# Patient Record
Sex: FEMALE | Race: WHITE | NOT HISPANIC OR LATINO
[De-identification: names, ages, dates, MRNs, and addresses within clinical notes are randomized per-mention and may not be internally consistent; named-entity substitution may affect disease eponyms.]

---

## 2017-05-12 ENCOUNTER — APPOINTMENT (OUTPATIENT)
Dept: ULTRASOUND IMAGING | Facility: IMAGING CENTER | Age: 47
End: 2017-05-12

## 2017-05-12 ENCOUNTER — OUTPATIENT (OUTPATIENT)
Dept: OUTPATIENT SERVICES | Facility: HOSPITAL | Age: 47
LOS: 1 days | End: 2017-05-12
Payer: COMMERCIAL

## 2017-05-12 DIAGNOSIS — N93.8 OTHER SPECIFIED ABNORMAL UTERINE AND VAGINAL BLEEDING: ICD-10-CM

## 2017-05-12 DIAGNOSIS — N84.0 POLYP OF CORPUS UTERI: ICD-10-CM

## 2017-05-12 PROCEDURE — 76830 TRANSVAGINAL US NON-OB: CPT

## 2017-05-12 PROCEDURE — 76856 US EXAM PELVIC COMPLETE: CPT

## 2017-05-18 DIAGNOSIS — D25.2 SUBSEROSAL LEIOMYOMA OF UTERUS: ICD-10-CM

## 2017-05-18 DIAGNOSIS — N92.6 IRREGULAR MENSTRUATION, UNSPECIFIED: ICD-10-CM

## 2017-05-25 ENCOUNTER — RESULT REVIEW (OUTPATIENT)
Age: 47
End: 2017-05-25

## 2017-06-20 ENCOUNTER — RESULT REVIEW (OUTPATIENT)
Age: 47
End: 2017-06-20

## 2017-06-29 ENCOUNTER — APPOINTMENT (OUTPATIENT)
Dept: MAMMOGRAPHY | Facility: IMAGING CENTER | Age: 47
End: 2017-06-29

## 2017-06-29 ENCOUNTER — APPOINTMENT (OUTPATIENT)
Dept: ULTRASOUND IMAGING | Facility: IMAGING CENTER | Age: 47
End: 2017-06-29

## 2017-06-29 ENCOUNTER — OUTPATIENT (OUTPATIENT)
Dept: OUTPATIENT SERVICES | Facility: HOSPITAL | Age: 47
LOS: 1 days | End: 2017-06-29
Payer: COMMERCIAL

## 2017-06-29 DIAGNOSIS — Z00.8 ENCOUNTER FOR OTHER GENERAL EXAMINATION: ICD-10-CM

## 2017-06-29 PROCEDURE — 77067 SCR MAMMO BI INCL CAD: CPT

## 2017-06-29 PROCEDURE — 76641 ULTRASOUND BREAST COMPLETE: CPT

## 2017-06-29 PROCEDURE — 77063 BREAST TOMOSYNTHESIS BI: CPT

## 2017-07-20 ENCOUNTER — OUTPATIENT (OUTPATIENT)
Dept: OUTPATIENT SERVICES | Facility: HOSPITAL | Age: 47
LOS: 1 days | End: 2017-07-20
Payer: COMMERCIAL

## 2017-07-20 VITALS
SYSTOLIC BLOOD PRESSURE: 119 MMHG | TEMPERATURE: 99 F | OXYGEN SATURATION: 100 % | WEIGHT: 177.03 LBS | DIASTOLIC BLOOD PRESSURE: 87 MMHG | HEART RATE: 77 BPM | RESPIRATION RATE: 14 BRPM | HEIGHT: 65 IN

## 2017-07-20 DIAGNOSIS — Z01.818 ENCOUNTER FOR OTHER PREPROCEDURAL EXAMINATION: ICD-10-CM

## 2017-07-20 DIAGNOSIS — Z98.890 OTHER SPECIFIED POSTPROCEDURAL STATES: Chronic | ICD-10-CM

## 2017-07-20 DIAGNOSIS — N84.0 POLYP OF CORPUS UTERI: ICD-10-CM

## 2017-07-20 PROCEDURE — G0463: CPT

## 2017-07-20 PROCEDURE — 85027 COMPLETE CBC AUTOMATED: CPT

## 2017-07-20 RX ORDER — LIDOCAINE HCL 20 MG/ML
0.2 VIAL (ML) INJECTION ONCE
Qty: 0 | Refills: 0 | Status: DISCONTINUED | OUTPATIENT
Start: 2017-07-26 | End: 2017-08-10

## 2017-07-20 RX ORDER — ACETAMINOPHEN 500 MG
975 TABLET ORAL ONCE
Qty: 0 | Refills: 0 | Status: COMPLETED | OUTPATIENT
Start: 2017-07-26 | End: 2017-07-26

## 2017-07-20 RX ORDER — SODIUM CHLORIDE 9 MG/ML
3 INJECTION INTRAMUSCULAR; INTRAVENOUS; SUBCUTANEOUS EVERY 8 HOURS
Qty: 0 | Refills: 0 | Status: DISCONTINUED | OUTPATIENT
Start: 2017-07-26 | End: 2017-08-10

## 2017-07-20 RX ORDER — CELECOXIB 200 MG/1
200 CAPSULE ORAL ONCE
Qty: 0 | Refills: 0 | Status: COMPLETED | OUTPATIENT
Start: 2017-07-26 | End: 2017-07-26

## 2017-07-20 NOTE — H&P PST ADULT - NSANTHOSAYNRD_GEN_A_CORE
No. LIZET screening performed.  STOP BANG Legend: 0-2 = LOW Risk; 3-4 = INTERMEDIATE Risk; 5-8 = HIGH Risk

## 2017-07-26 ENCOUNTER — OUTPATIENT (OUTPATIENT)
Dept: OUTPATIENT SERVICES | Facility: HOSPITAL | Age: 47
LOS: 1 days | End: 2017-07-26
Payer: COMMERCIAL

## 2017-07-26 ENCOUNTER — TRANSCRIPTION ENCOUNTER (OUTPATIENT)
Age: 47
End: 2017-07-26

## 2017-07-26 ENCOUNTER — RESULT REVIEW (OUTPATIENT)
Age: 47
End: 2017-07-26

## 2017-07-26 VITALS
RESPIRATION RATE: 14 BRPM | HEART RATE: 72 BPM | TEMPERATURE: 98 F | SYSTOLIC BLOOD PRESSURE: 138 MMHG | DIASTOLIC BLOOD PRESSURE: 81 MMHG | OXYGEN SATURATION: 100 %

## 2017-07-26 VITALS
SYSTOLIC BLOOD PRESSURE: 139 MMHG | WEIGHT: 177.03 LBS | DIASTOLIC BLOOD PRESSURE: 87 MMHG | HEART RATE: 66 BPM | TEMPERATURE: 99 F | HEIGHT: 65 IN | RESPIRATION RATE: 22 BRPM | OXYGEN SATURATION: 100 %

## 2017-07-26 DIAGNOSIS — N84.0 POLYP OF CORPUS UTERI: ICD-10-CM

## 2017-07-26 DIAGNOSIS — Z98.890 OTHER SPECIFIED POSTPROCEDURAL STATES: Chronic | ICD-10-CM

## 2017-07-26 PROCEDURE — 88305 TISSUE EXAM BY PATHOLOGIST: CPT

## 2017-07-26 PROCEDURE — 88302 TISSUE EXAM BY PATHOLOGIST: CPT

## 2017-07-26 PROCEDURE — 88305 TISSUE EXAM BY PATHOLOGIST: CPT | Mod: 26

## 2017-07-26 PROCEDURE — 58558 HYSTEROSCOPY BIOPSY: CPT

## 2017-07-26 PROCEDURE — 88302 TISSUE EXAM BY PATHOLOGIST: CPT | Mod: 26

## 2017-07-26 RX ORDER — FAMOTIDINE 10 MG/ML
0 INJECTION INTRAVENOUS
Qty: 0 | Refills: 0 | COMMUNITY

## 2017-07-26 RX ORDER — OXYCODONE HYDROCHLORIDE 5 MG/1
10 TABLET ORAL ONCE
Qty: 0 | Refills: 0 | Status: DISCONTINUED | OUTPATIENT
Start: 2017-07-26 | End: 2017-07-26

## 2017-07-26 RX ORDER — CELECOXIB 200 MG/1
200 CAPSULE ORAL ONCE
Qty: 0 | Refills: 0 | Status: COMPLETED | OUTPATIENT
Start: 2017-07-26 | End: 2017-07-26

## 2017-07-26 RX ORDER — SODIUM CHLORIDE 9 MG/ML
1000 INJECTION, SOLUTION INTRAVENOUS
Qty: 0 | Refills: 0 | Status: DISCONTINUED | OUTPATIENT
Start: 2017-07-26 | End: 2017-08-10

## 2017-07-26 RX ORDER — PROGESTERONE 200 MG/1
0 CAPSULE, LIQUID FILLED ORAL
Qty: 0 | Refills: 0 | COMMUNITY

## 2017-07-26 RX ORDER — ONDANSETRON 8 MG/1
4 TABLET, FILM COATED ORAL ONCE
Qty: 0 | Refills: 0 | Status: DISCONTINUED | OUTPATIENT
Start: 2017-07-26 | End: 2017-08-10

## 2017-07-26 RX ADMIN — CELECOXIB 200 MILLIGRAM(S): 200 CAPSULE ORAL at 11:53

## 2017-07-26 RX ADMIN — Medication 975 MILLIGRAM(S): at 11:53

## 2017-07-26 RX ADMIN — CELECOXIB 200 MILLIGRAM(S): 200 CAPSULE ORAL at 13:55

## 2017-07-26 NOTE — ASU DISCHARGE PLAN (ADULT/PEDIATRIC). - NOTIFY
Numbness, tingling/Persistent Nausea and Vomiting/Unable to Urinate/Swelling that continues/GYN Fever>100.4/Numbness, color, or temperature change to extremity/Inability to Tolerate Liquids or Foods/Excessive Diarrhea/Increased Irritability or Sluggishness/Pain not relieved by Medications/Bleeding that does not stop

## 2017-07-26 NOTE — ASU DISCHARGE PLAN (ADULT/PEDIATRIC). - MEDICATION SUMMARY - MEDICATIONS TO TAKE
I will START or STAY ON the medications listed below when I get home from the hospital:    Motrin  mg oral capsule  -- 2 cap(s) by mouth every 6 hours, As Needed  -- Indication: For Pain    Tylenol 325 mg oral capsule  -- 3 cap(s) by mouth every 6 hours, As Needed  -- Indication: For Pain

## 2017-07-26 NOTE — ASU DISCHARGE PLAN (ADULT/PEDIATRIC). - MEDICATION SUMMARY - MEDICATIONS TO STOP TAKING
I will STOP taking the medications listed below when I get home from the hospital:    famotidine 20 mg oral tablet  --  by mouth once a day pm and am of surgery    progesterone 100 mg oral capsule  --  by mouth once a day

## 2017-07-31 LAB — SURGICAL PATHOLOGY STUDY: SIGNIFICANT CHANGE UP

## 2018-03-03 ENCOUNTER — APPOINTMENT (OUTPATIENT)
Dept: ULTRASOUND IMAGING | Facility: IMAGING CENTER | Age: 48
End: 2018-03-03
Payer: COMMERCIAL

## 2018-03-03 ENCOUNTER — OUTPATIENT (OUTPATIENT)
Dept: OUTPATIENT SERVICES | Facility: HOSPITAL | Age: 48
LOS: 1 days | End: 2018-03-03
Payer: COMMERCIAL

## 2018-03-03 DIAGNOSIS — N92.0 EXCESSIVE AND FREQUENT MENSTRUATION WITH REGULAR CYCLE: ICD-10-CM

## 2018-03-03 DIAGNOSIS — Z98.890 OTHER SPECIFIED POSTPROCEDURAL STATES: Chronic | ICD-10-CM

## 2018-03-03 PROCEDURE — 76830 TRANSVAGINAL US NON-OB: CPT | Mod: 26

## 2018-03-03 PROCEDURE — 76830 TRANSVAGINAL US NON-OB: CPT

## 2018-06-01 ENCOUNTER — OUTPATIENT (OUTPATIENT)
Dept: OUTPATIENT SERVICES | Facility: HOSPITAL | Age: 48
LOS: 1 days | End: 2018-06-01
Payer: COMMERCIAL

## 2018-06-01 VITALS
OXYGEN SATURATION: 99 % | TEMPERATURE: 98 F | HEIGHT: 65 IN | HEART RATE: 77 BPM | RESPIRATION RATE: 16 BRPM | DIASTOLIC BLOOD PRESSURE: 80 MMHG | WEIGHT: 176.37 LBS | SYSTOLIC BLOOD PRESSURE: 130 MMHG

## 2018-06-01 DIAGNOSIS — N84.0 POLYP OF CORPUS UTERI: ICD-10-CM

## 2018-06-01 DIAGNOSIS — Z98.890 OTHER SPECIFIED POSTPROCEDURAL STATES: Chronic | ICD-10-CM

## 2018-06-01 DIAGNOSIS — Z01.818 ENCOUNTER FOR OTHER PREPROCEDURAL EXAMINATION: ICD-10-CM

## 2018-06-01 LAB
HCT VFR BLD CALC: 36.6 % — SIGNIFICANT CHANGE UP (ref 34.5–45)
HGB BLD-MCNC: 12.2 G/DL — SIGNIFICANT CHANGE UP (ref 11.5–15.5)
MCHC RBC-ENTMCNC: 30.3 PG — SIGNIFICANT CHANGE UP (ref 27–34)
MCHC RBC-ENTMCNC: 33.3 GM/DL — SIGNIFICANT CHANGE UP (ref 32–36)
MCV RBC AUTO: 91 FL — SIGNIFICANT CHANGE UP (ref 80–100)
PLATELET # BLD AUTO: 407 K/UL — HIGH (ref 150–400)
RBC # BLD: 4.02 M/UL — SIGNIFICANT CHANGE UP (ref 3.8–5.2)
RBC # FLD: 13.2 % — SIGNIFICANT CHANGE UP (ref 10.3–14.5)
WBC # BLD: 8.02 K/UL — SIGNIFICANT CHANGE UP (ref 3.8–10.5)
WBC # FLD AUTO: 8.02 K/UL — SIGNIFICANT CHANGE UP (ref 3.8–10.5)

## 2018-06-01 PROCEDURE — 85027 COMPLETE CBC AUTOMATED: CPT

## 2018-06-01 PROCEDURE — G0463: CPT

## 2018-06-01 RX ORDER — SODIUM CHLORIDE 9 MG/ML
3 INJECTION INTRAMUSCULAR; INTRAVENOUS; SUBCUTANEOUS EVERY 8 HOURS
Qty: 0 | Refills: 0 | Status: DISCONTINUED | OUTPATIENT
Start: 2018-06-12 | End: 2018-06-27

## 2018-06-01 RX ORDER — IBUPROFEN 200 MG
2 TABLET ORAL
Qty: 0 | Refills: 0 | COMMUNITY

## 2018-06-01 RX ORDER — LIDOCAINE HCL 20 MG/ML
0.2 VIAL (ML) INJECTION ONCE
Qty: 0 | Refills: 0 | Status: DISCONTINUED | OUTPATIENT
Start: 2018-06-12 | End: 2018-06-27

## 2018-06-01 RX ORDER — ACETAMINOPHEN 500 MG
1000 TABLET ORAL ONCE
Qty: 0 | Refills: 0 | Status: COMPLETED | OUTPATIENT
Start: 2018-06-12 | End: 2018-06-12

## 2018-06-01 NOTE — H&P PST ADULT - NS PRO ABUSE SCREEN SUSPICION NEGLECT YN
Requesting refill [facility] Any concerns, please call fadi Evonne at [phone]   no

## 2018-06-01 NOTE — H&P PST ADULT - MUSCULOSKELETAL
detailed exam normal strength/no joint warmth/no calf tenderness/ROM intact/no joint swelling/no joint erythema details…

## 2018-06-01 NOTE — H&P PST ADULT - HISTORY OF PRESENT ILLNESS
47 year old female with recurrent polyps of corpus uteri coming in for D&C operative hysteroscopy polypectomy on 06/12/18.

## 2018-06-11 ENCOUNTER — TRANSCRIPTION ENCOUNTER (OUTPATIENT)
Age: 48
End: 2018-06-11

## 2018-06-12 ENCOUNTER — RESULT REVIEW (OUTPATIENT)
Age: 48
End: 2018-06-12

## 2018-06-12 ENCOUNTER — OUTPATIENT (OUTPATIENT)
Dept: OUTPATIENT SERVICES | Facility: HOSPITAL | Age: 48
LOS: 1 days | End: 2018-06-12
Payer: COMMERCIAL

## 2018-06-12 VITALS
HEART RATE: 62 BPM | SYSTOLIC BLOOD PRESSURE: 117 MMHG | OXYGEN SATURATION: 100 % | DIASTOLIC BLOOD PRESSURE: 63 MMHG | RESPIRATION RATE: 15 BRPM

## 2018-06-12 VITALS
OXYGEN SATURATION: 100 % | TEMPERATURE: 99 F | HEIGHT: 65 IN | RESPIRATION RATE: 18 BRPM | WEIGHT: 176.37 LBS | DIASTOLIC BLOOD PRESSURE: 78 MMHG | HEART RATE: 67 BPM | SYSTOLIC BLOOD PRESSURE: 113 MMHG

## 2018-06-12 DIAGNOSIS — Z01.818 ENCOUNTER FOR OTHER PREPROCEDURAL EXAMINATION: ICD-10-CM

## 2018-06-12 DIAGNOSIS — N84.0 POLYP OF CORPUS UTERI: ICD-10-CM

## 2018-06-12 DIAGNOSIS — Z98.890 OTHER SPECIFIED POSTPROCEDURAL STATES: Chronic | ICD-10-CM

## 2018-06-12 PROCEDURE — 88305 TISSUE EXAM BY PATHOLOGIST: CPT

## 2018-06-12 PROCEDURE — 58558 HYSTEROSCOPY BIOPSY: CPT

## 2018-06-12 PROCEDURE — 88305 TISSUE EXAM BY PATHOLOGIST: CPT | Mod: 26

## 2018-06-12 RX ORDER — OXYCODONE HYDROCHLORIDE 5 MG/1
5 TABLET ORAL ONCE
Qty: 0 | Refills: 0 | Status: DISCONTINUED | OUTPATIENT
Start: 2018-06-12 | End: 2018-06-12

## 2018-06-12 RX ORDER — ACETAMINOPHEN 500 MG
3 TABLET ORAL
Qty: 0 | Refills: 0 | COMMUNITY

## 2018-06-12 RX ORDER — ONDANSETRON 8 MG/1
4 TABLET, FILM COATED ORAL ONCE
Qty: 0 | Refills: 0 | Status: DISCONTINUED | OUTPATIENT
Start: 2018-06-12 | End: 2018-06-27

## 2018-06-12 RX ORDER — CELECOXIB 200 MG/1
200 CAPSULE ORAL ONCE
Qty: 0 | Refills: 0 | Status: COMPLETED | OUTPATIENT
Start: 2018-06-12 | End: 2018-06-12

## 2018-06-12 RX ORDER — SODIUM CHLORIDE 9 MG/ML
1000 INJECTION, SOLUTION INTRAVENOUS
Qty: 0 | Refills: 0 | Status: DISCONTINUED | OUTPATIENT
Start: 2018-06-12 | End: 2018-06-27

## 2018-06-12 RX ADMIN — CELECOXIB 200 MILLIGRAM(S): 200 CAPSULE ORAL at 08:19

## 2018-06-12 RX ADMIN — CELECOXIB 200 MILLIGRAM(S): 200 CAPSULE ORAL at 10:55

## 2018-06-12 RX ADMIN — Medication 1000 MILLIGRAM(S): at 08:19

## 2018-06-12 NOTE — BRIEF OPERATIVE NOTE - PROCEDURE
<<-----Click on this checkbox to enter Procedure Operative hysteroscopy with dilation and curettage of uterus if indicated  06/12/2018    Active  BRAYAN

## 2018-06-12 NOTE — BRIEF OPERATIVE NOTE - OPERATION/FINDINGS
Anteverted normal sized uterus, no adnexal masses appreciated. Diffusely polypoid tissue throughout uterine cavity; no discrete masses or polyps visualized.

## 2018-06-12 NOTE — ASU DISCHARGE PLAN (ADULT/PEDIATRIC). - NOTIFY
Bleeding that does not stop/Persistent Nausea and Vomiting/Unable to Urinate/Swelling that continues/GYN Fever>100.4

## 2018-06-12 NOTE — ASU DISCHARGE PLAN (ADULT/PEDIATRIC). - MEDICATION SUMMARY - MEDICATIONS TO TAKE
I will START or STAY ON the medications listed below when I get home from the hospital:    Tylenol 325 mg oral capsule  -- 3 cap(s) by mouth every 6 hours, As Needed  -- Indication: For Prn pain

## 2018-06-14 LAB — SURGICAL PATHOLOGY STUDY: SIGNIFICANT CHANGE UP

## 2018-07-05 ENCOUNTER — OUTPATIENT (OUTPATIENT)
Dept: OUTPATIENT SERVICES | Facility: HOSPITAL | Age: 48
LOS: 1 days | End: 2018-07-05
Payer: COMMERCIAL

## 2018-07-05 ENCOUNTER — APPOINTMENT (OUTPATIENT)
Dept: ULTRASOUND IMAGING | Facility: IMAGING CENTER | Age: 48
End: 2018-07-05
Payer: COMMERCIAL

## 2018-07-05 ENCOUNTER — APPOINTMENT (OUTPATIENT)
Dept: MAMMOGRAPHY | Facility: IMAGING CENTER | Age: 48
End: 2018-07-05
Payer: COMMERCIAL

## 2018-07-05 DIAGNOSIS — Z98.890 OTHER SPECIFIED POSTPROCEDURAL STATES: Chronic | ICD-10-CM

## 2018-07-05 DIAGNOSIS — Z00.00 ENCOUNTER FOR GENERAL ADULT MEDICAL EXAMINATION WITHOUT ABNORMAL FINDINGS: ICD-10-CM

## 2018-07-05 PROCEDURE — 77063 BREAST TOMOSYNTHESIS BI: CPT

## 2018-07-05 PROCEDURE — 77067 SCR MAMMO BI INCL CAD: CPT

## 2018-07-05 PROCEDURE — 77067 SCR MAMMO BI INCL CAD: CPT | Mod: 26

## 2018-07-05 PROCEDURE — 76641 ULTRASOUND BREAST COMPLETE: CPT | Mod: 26,50

## 2018-07-05 PROCEDURE — 77063 BREAST TOMOSYNTHESIS BI: CPT | Mod: 26

## 2018-07-05 PROCEDURE — 76641 ULTRASOUND BREAST COMPLETE: CPT

## 2018-07-16 ENCOUNTER — APPOINTMENT (OUTPATIENT)
Dept: ULTRASOUND IMAGING | Facility: IMAGING CENTER | Age: 48
End: 2018-07-16
Payer: COMMERCIAL

## 2018-07-16 ENCOUNTER — OUTPATIENT (OUTPATIENT)
Dept: OUTPATIENT SERVICES | Facility: HOSPITAL | Age: 48
LOS: 1 days | End: 2018-07-16
Payer: COMMERCIAL

## 2018-07-16 ENCOUNTER — APPOINTMENT (OUTPATIENT)
Dept: MAMMOGRAPHY | Facility: IMAGING CENTER | Age: 48
End: 2018-07-16
Payer: COMMERCIAL

## 2018-07-16 DIAGNOSIS — Z98.890 OTHER SPECIFIED POSTPROCEDURAL STATES: Chronic | ICD-10-CM

## 2018-07-16 DIAGNOSIS — Z00.8 ENCOUNTER FOR OTHER GENERAL EXAMINATION: ICD-10-CM

## 2018-07-16 PROBLEM — G43.909 MIGRAINE, UNSPECIFIED, NOT INTRACTABLE, WITHOUT STATUS MIGRAINOSUS: Chronic | Status: ACTIVE | Noted: 2017-07-20

## 2018-07-16 PROCEDURE — 76642 ULTRASOUND BREAST LIMITED: CPT

## 2018-07-16 PROCEDURE — 76642 ULTRASOUND BREAST LIMITED: CPT | Mod: 26,LT

## 2018-07-16 PROCEDURE — 77065 DX MAMMO INCL CAD UNI: CPT | Mod: 26,LT

## 2018-07-16 PROCEDURE — 77065 DX MAMMO INCL CAD UNI: CPT

## 2018-07-16 PROCEDURE — G0279: CPT | Mod: 26

## 2018-07-16 PROCEDURE — G0279: CPT

## 2018-09-03 ENCOUNTER — RESULT REVIEW (OUTPATIENT)
Age: 48
End: 2018-09-03

## 2018-11-21 NOTE — ASU PATIENT PROFILE, ADULT - FALL HARM RISK CONCLUSION
Message  Received VM from Kaylah at [facility] stating patient is scheduled for cataract surgery on 1.11.18 and requesting cardiac clearance. Call back is [phone] fax: [phone]    Call to patient and she states her PCP in [place] cleared her. Appointment made for 1.9.18 with Dr. Mosley and she is going to call Kaylah at [facility] and make sure she received clearance and EKG from PCP.      Signatures   Electronically signed by : Jeny Desai R.N.; Jan 8 2018  3:28PM CST    
Universal Safety Interventions

## 2019-01-17 ENCOUNTER — OUTPATIENT (OUTPATIENT)
Dept: OUTPATIENT SERVICES | Facility: HOSPITAL | Age: 49
LOS: 1 days | End: 2019-01-17
Payer: COMMERCIAL

## 2019-01-17 ENCOUNTER — APPOINTMENT (OUTPATIENT)
Dept: ULTRASOUND IMAGING | Facility: IMAGING CENTER | Age: 49
End: 2019-01-17
Payer: COMMERCIAL

## 2019-01-17 DIAGNOSIS — Z00.8 ENCOUNTER FOR OTHER GENERAL EXAMINATION: ICD-10-CM

## 2019-01-17 DIAGNOSIS — Z98.890 OTHER SPECIFIED POSTPROCEDURAL STATES: Chronic | ICD-10-CM

## 2019-01-17 PROBLEM — N92.0 EXCESSIVE AND FREQUENT MENSTRUATION WITH REGULAR CYCLE: Chronic | Status: ACTIVE | Noted: 2018-06-01

## 2019-01-17 PROCEDURE — 76642 ULTRASOUND BREAST LIMITED: CPT

## 2019-01-17 PROCEDURE — 76642 ULTRASOUND BREAST LIMITED: CPT | Mod: 26,LT

## 2019-07-08 ENCOUNTER — APPOINTMENT (OUTPATIENT)
Dept: MAMMOGRAPHY | Facility: IMAGING CENTER | Age: 49
End: 2019-07-08
Payer: COMMERCIAL

## 2019-07-08 ENCOUNTER — APPOINTMENT (OUTPATIENT)
Dept: ULTRASOUND IMAGING | Facility: IMAGING CENTER | Age: 49
End: 2019-07-08
Payer: COMMERCIAL

## 2019-07-08 ENCOUNTER — OUTPATIENT (OUTPATIENT)
Dept: OUTPATIENT SERVICES | Facility: HOSPITAL | Age: 49
LOS: 1 days | End: 2019-07-08
Payer: COMMERCIAL

## 2019-07-08 DIAGNOSIS — Z98.890 OTHER SPECIFIED POSTPROCEDURAL STATES: Chronic | ICD-10-CM

## 2019-07-08 DIAGNOSIS — Z00.8 ENCOUNTER FOR OTHER GENERAL EXAMINATION: ICD-10-CM

## 2019-07-08 PROCEDURE — 76641 ULTRASOUND BREAST COMPLETE: CPT | Mod: 26,50

## 2019-07-08 PROCEDURE — 77063 BREAST TOMOSYNTHESIS BI: CPT | Mod: 26

## 2019-07-08 PROCEDURE — 77063 BREAST TOMOSYNTHESIS BI: CPT

## 2019-07-08 PROCEDURE — 77067 SCR MAMMO BI INCL CAD: CPT | Mod: 26

## 2019-07-08 PROCEDURE — 76641 ULTRASOUND BREAST COMPLETE: CPT

## 2019-07-08 PROCEDURE — 77067 SCR MAMMO BI INCL CAD: CPT

## 2019-07-18 NOTE — PRE-ANESTHESIA EVALUATION ADULT - NSANTHASARD_GEN_ALL_CORE
Pt. is here today for follow up on type 1 diabetes. Patient was last seen 6/13/19    1. How often do you self-monitor? 3 times a day by blood and 4-5 times by dexcome   2. Patient yes have meter/log book today.    3. Are you experiencing low blood sugar? 2-3 times/week  4. Any 911 calls/hospitalizations related to your diabetes since your last visit? no    Denies known Latex allergy or symptoms of Latex sensitivity.   Medication list verified.   Tobacco use verified.    Random blood sugar per verbal order Dr. Mayberry 119. Patient last ate at 1200 pm.  Provider notified.              2

## 2019-07-22 NOTE — ASU PREOP CHECKLIST - TEMPERATURE IN CELSIUS (DEGREES C)
37 Opioid Pregnancy And Lactation Text: These medications can lead to premature delivery and should be avoided during pregnancy. These medications are also present in breast milk in small amounts.

## 2019-09-17 ENCOUNTER — RESULT REVIEW (OUTPATIENT)
Age: 49
End: 2019-09-17

## 2020-04-25 ENCOUNTER — MESSAGE (OUTPATIENT)
Age: 50
End: 2020-04-25

## 2020-05-04 ENCOUNTER — APPOINTMENT (OUTPATIENT)
Dept: DISASTER EMERGENCY | Facility: CLINIC | Age: 50
End: 2020-05-04

## 2020-05-05 LAB
SARS-COV-2 IGG SERPL IA-ACNC: 0.3 INDEX
SARS-COV-2 IGG SERPL QL IA: NEGATIVE

## 2020-06-13 NOTE — ASU PATIENT PROFILE, ADULT - VISION (WITH CORRECTIVE LENSES IF THE PATIENT USUALLY WEARS THEM):
Cc: Gait dysfunction    HPI: Patient with no new medical issues today.  Pain controlled, no chest pain, no N/V, no Fevers/Chills. No other new ROS  Has been tolerating rehabilitation program.    acetaminophen   Tablet .. 650 milliGRAM(s) Oral every 6 hours PRN  amLODIPine   Tablet 5 milliGRAM(s) Oral daily  aspirin enteric coated 81 milliGRAM(s) Oral daily  atorvastatin 40 milliGRAM(s) Oral at bedtime  clopidogrel Tablet 75 milliGRAM(s) Oral daily  enoxaparin Injectable 40 milliGRAM(s) SubCutaneous daily  meclizine 25 milliGRAM(s) Oral every 8 hours PRN  senna 2 Tablet(s) Oral at bedtime    T(C): 36.6 (06-12-20 @ 20:16), Max: 36.6 (06-12-20 @ 20:16)  HR: 77 (06-13-20 @ 05:33) (53 - 77)  BP: 132/80 (06-13-20 @ 05:33) (132/80 - 149/98)  RR: 14 (06-12-20 @ 20:16) (14 - 14)  SpO2: 96% (06-12-20 @ 20:16) (96% - 98%)    In NAD  HEENT- EOMI  Heart- RRR, S1S2  Lungs- CTA bl.  Abd- + BS, NT  Ext- No calf pain  Neuro- Exam unchanged    Imp: Patient with diagnosis of CVA admitted for comprehensive acute rehabilitation.    Plan:  - Continue therapies  - DVT prophylaxis- Lovenox  - Skin- Turn q2h, check skin daily  - Continue current medications; patient medically stable.   - Patient is stable to continue current rehabilitation program. Normal vision: sees adequately in most situations; can see medication labels, newsprint

## 2020-07-10 ENCOUNTER — RESULT REVIEW (OUTPATIENT)
Age: 50
End: 2020-07-10

## 2020-07-10 ENCOUNTER — OUTPATIENT (OUTPATIENT)
Dept: OUTPATIENT SERVICES | Facility: HOSPITAL | Age: 50
LOS: 1 days | End: 2020-07-10
Payer: COMMERCIAL

## 2020-07-10 ENCOUNTER — APPOINTMENT (OUTPATIENT)
Dept: MAMMOGRAPHY | Facility: IMAGING CENTER | Age: 50
End: 2020-07-10
Payer: COMMERCIAL

## 2020-07-10 ENCOUNTER — APPOINTMENT (OUTPATIENT)
Dept: ULTRASOUND IMAGING | Facility: IMAGING CENTER | Age: 50
End: 2020-07-10
Payer: COMMERCIAL

## 2020-07-10 DIAGNOSIS — Z00.8 ENCOUNTER FOR OTHER GENERAL EXAMINATION: ICD-10-CM

## 2020-07-10 DIAGNOSIS — Z98.890 OTHER SPECIFIED POSTPROCEDURAL STATES: Chronic | ICD-10-CM

## 2020-07-10 PROCEDURE — 76641 ULTRASOUND BREAST COMPLETE: CPT | Mod: 26,50

## 2020-07-10 PROCEDURE — 77063 BREAST TOMOSYNTHESIS BI: CPT

## 2020-07-10 PROCEDURE — 77067 SCR MAMMO BI INCL CAD: CPT | Mod: 26

## 2020-07-10 PROCEDURE — 77063 BREAST TOMOSYNTHESIS BI: CPT | Mod: 26

## 2020-07-10 PROCEDURE — 76641 ULTRASOUND BREAST COMPLETE: CPT

## 2020-07-10 PROCEDURE — 77067 SCR MAMMO BI INCL CAD: CPT

## 2020-11-24 ENCOUNTER — RESULT REVIEW (OUTPATIENT)
Age: 50
End: 2020-11-24

## 2021-07-16 ENCOUNTER — APPOINTMENT (OUTPATIENT)
Dept: ULTRASOUND IMAGING | Facility: IMAGING CENTER | Age: 51
End: 2021-07-16
Payer: COMMERCIAL

## 2021-07-16 ENCOUNTER — APPOINTMENT (OUTPATIENT)
Dept: MAMMOGRAPHY | Facility: IMAGING CENTER | Age: 51
End: 2021-07-16
Payer: COMMERCIAL

## 2021-07-16 ENCOUNTER — OUTPATIENT (OUTPATIENT)
Dept: OUTPATIENT SERVICES | Facility: HOSPITAL | Age: 51
LOS: 1 days | End: 2021-07-16
Payer: COMMERCIAL

## 2021-07-16 DIAGNOSIS — Z00.8 ENCOUNTER FOR OTHER GENERAL EXAMINATION: ICD-10-CM

## 2021-07-16 DIAGNOSIS — Z98.890 OTHER SPECIFIED POSTPROCEDURAL STATES: Chronic | ICD-10-CM

## 2021-07-16 PROCEDURE — 76641 ULTRASOUND BREAST COMPLETE: CPT | Mod: 26,50

## 2021-07-16 PROCEDURE — 77067 SCR MAMMO BI INCL CAD: CPT

## 2021-07-16 PROCEDURE — 77067 SCR MAMMO BI INCL CAD: CPT | Mod: 26

## 2021-07-16 PROCEDURE — 77063 BREAST TOMOSYNTHESIS BI: CPT | Mod: 26

## 2021-07-16 PROCEDURE — 77063 BREAST TOMOSYNTHESIS BI: CPT

## 2021-07-16 PROCEDURE — 76641 ULTRASOUND BREAST COMPLETE: CPT

## 2021-10-26 NOTE — H&P PST ADULT - SOURCE OF INFORMATION, PROFILE
Tal Jurado was notified of peritoneal dialysis needed today. Catheter was not cultured, no drainage was present. Primary MD notified of admission. Dr. Vicenta Brumfield notified of admission. No significant changes throughout shift. Problem: Pressure Injury, Risk for  Goal: # Skin remains intact  Outcome: Outcome Not Met, Continue to Monitor  Goal: No new pressure injury (PI) development  Outcome: Outcome Not Met, Continue to Monitor  Goal: # Verbalizes understanding of PI risk factors and prevention strategies  Description: Document education using the patient education activity. Outcome: Outcome Not Met, Continue to Monitor     Problem: Pressure Injury Actual  Goal: # No deterioration in pressure injury (PI)  Outcome: Outcome Not Met, Continue to Monitor  Goal: # Verbalizes pressure injury management  Description: Document education using the patient education activity. Outcome: Outcome Not Met, Continue to Monitor     Problem: Non-Pressure Injury Wound  Goal: # No deterioration in wound  Outcome: Outcome Not Met, Continue to Monitor  Goal: # Verbalizes understanding of wound and wound care  Description: If abnormality is a skin tear, avoid using tape on skin including transparent dressings. Document education using the patient education activity. Outcome: Outcome Not Met, Continue to Monitor  Goal: Participates in wound care activities  Outcome: Outcome Not Met, Continue to Monitor     Problem: At Risk for Falls  Goal: # Patient does not fall  Outcome: Outcome Not Met, Continue to Monitor  Goal: # Takes action to control fall-related risks  Outcome: Outcome Not Met, Continue to Monitor  Goal: # Verbalizes understanding of fall risk/precautions  Description: Document education using the patient education activity  Outcome: Outcome Not Met, Continue to Monitor     Problem:  At Risk for Injury Due to Fall  Goal: # Patient does not fall  Outcome: Outcome Not Met, Continue to Monitor  Goal: # Takes action to control condition specific risks  Outcome: Outcome Not Met, Continue to Monitor  Goal: # Verbalizes understanding of fall-related injury personal risks  Description: Document education using the patient education activity  Outcome: Outcome Not Met, Continue to Monitor     Problem: Pain  Goal: #Acceptable pain level achieved/maintained at rest using NRS/Faces  Description: This goal is used for patients who can self-report. Acceptable means the level is at or below the identified comfort/function goal.  Outcome: Outcome Not Met, Continue to Monitor  Goal: # Acceptable pain level achieved/maintained at rest using NRS/Faces without oversedation (opioid naive or PCA/Epidural infusion)  Description: This goal is used if Opioid-naÃ¯ve or on PCA/Epidural Infusion. Outcome: Outcome Not Met, Continue to Monitor  Goal: # Acceptable pain level achieved/maintained with activity using NRS/Faces  Description: This goal is used for patients who can self-report and are not achieving acceptable pain control during activity. Outcome: Outcome Not Met, Continue to Monitor  Goal: Acceptable pain/comfort level is achieved/maintained at rest (based on Pain Behaviors Scale)  Description: This goal is used for patients who are not able to self-report pain and are assessed for pain using the Pain Behaviors Scale  Outcome: Outcome Not Met, Continue to Monitor  Goal: Acceptable pain/comfort level is achieved/maintained at rest (based on pediatric behavior tool: NIPS, NPASS, or FLACC)  Description: This goal is used for pediatric patients who are not able to self report pain.   Outcome: Outcome Not Met, Continue to Monitor  Goal: # Verbalizes understanding of pain management  Description: Documented in Patient Education Activity  Outcome: Outcome Not Met, Continue to Monitor patient

## 2021-11-22 NOTE — PACU DISCHARGE NOTE - NSCLINEINSERTRD_GEN_ALL_CORE
Lisinopril, amlodipine refilled per [facility] protocol.  Should have 1 refill remaining on spironolactone.  Routing mobic refill request to provider for review/approval because:  Age >65 yo.  GEORGINA Palacios RN         No

## 2022-01-18 ENCOUNTER — OUTPATIENT (OUTPATIENT)
Dept: OUTPATIENT SERVICES | Facility: HOSPITAL | Age: 52
LOS: 1 days | End: 2022-01-18
Payer: COMMERCIAL

## 2022-01-18 ENCOUNTER — APPOINTMENT (OUTPATIENT)
Dept: ULTRASOUND IMAGING | Facility: IMAGING CENTER | Age: 52
End: 2022-01-18
Payer: COMMERCIAL

## 2022-01-18 DIAGNOSIS — Z98.890 OTHER SPECIFIED POSTPROCEDURAL STATES: Chronic | ICD-10-CM

## 2022-01-18 DIAGNOSIS — R92.2 INCONCLUSIVE MAMMOGRAM: ICD-10-CM

## 2022-01-18 DIAGNOSIS — Z00.8 ENCOUNTER FOR OTHER GENERAL EXAMINATION: ICD-10-CM

## 2022-01-18 PROCEDURE — 76642 ULTRASOUND BREAST LIMITED: CPT

## 2022-01-18 PROCEDURE — 76642 ULTRASOUND BREAST LIMITED: CPT | Mod: 26,LT

## 2022-02-21 ENCOUNTER — RESULT REVIEW (OUTPATIENT)
Age: 52
End: 2022-02-21

## 2022-07-22 ENCOUNTER — APPOINTMENT (OUTPATIENT)
Dept: ULTRASOUND IMAGING | Facility: IMAGING CENTER | Age: 52
End: 2022-07-22

## 2022-07-22 ENCOUNTER — APPOINTMENT (OUTPATIENT)
Dept: MAMMOGRAPHY | Facility: IMAGING CENTER | Age: 52
End: 2022-07-22

## 2022-07-22 ENCOUNTER — OUTPATIENT (OUTPATIENT)
Dept: OUTPATIENT SERVICES | Facility: HOSPITAL | Age: 52
LOS: 1 days | End: 2022-07-22
Payer: COMMERCIAL

## 2022-07-22 DIAGNOSIS — Z98.890 OTHER SPECIFIED POSTPROCEDURAL STATES: Chronic | ICD-10-CM

## 2022-07-22 DIAGNOSIS — Z00.8 ENCOUNTER FOR OTHER GENERAL EXAMINATION: ICD-10-CM

## 2022-07-22 PROCEDURE — 76641 ULTRASOUND BREAST COMPLETE: CPT | Mod: 26,50

## 2022-07-22 PROCEDURE — 76641 ULTRASOUND BREAST COMPLETE: CPT

## 2022-07-22 PROCEDURE — 77067 SCR MAMMO BI INCL CAD: CPT | Mod: 26

## 2022-07-22 PROCEDURE — 77063 BREAST TOMOSYNTHESIS BI: CPT

## 2022-07-22 PROCEDURE — 77063 BREAST TOMOSYNTHESIS BI: CPT | Mod: 26

## 2022-07-22 PROCEDURE — 77067 SCR MAMMO BI INCL CAD: CPT

## 2023-07-31 ENCOUNTER — APPOINTMENT (OUTPATIENT)
Dept: ULTRASOUND IMAGING | Facility: IMAGING CENTER | Age: 53
End: 2023-07-31
Payer: COMMERCIAL

## 2023-07-31 ENCOUNTER — APPOINTMENT (OUTPATIENT)
Dept: MAMMOGRAPHY | Facility: IMAGING CENTER | Age: 53
End: 2023-07-31
Payer: COMMERCIAL

## 2023-07-31 ENCOUNTER — OUTPATIENT (OUTPATIENT)
Dept: OUTPATIENT SERVICES | Facility: HOSPITAL | Age: 53
LOS: 1 days | End: 2023-07-31
Payer: COMMERCIAL

## 2023-07-31 DIAGNOSIS — Z98.890 OTHER SPECIFIED POSTPROCEDURAL STATES: Chronic | ICD-10-CM

## 2023-07-31 DIAGNOSIS — Z12.31 ENCOUNTER FOR SCREENING MAMMOGRAM FOR MALIGNANT NEOPLASM OF BREAST: ICD-10-CM

## 2023-07-31 DIAGNOSIS — R92.2 INCONCLUSIVE MAMMOGRAM: ICD-10-CM

## 2023-07-31 PROCEDURE — 77063 BREAST TOMOSYNTHESIS BI: CPT | Mod: 26

## 2023-07-31 PROCEDURE — 77067 SCR MAMMO BI INCL CAD: CPT | Mod: 26

## 2023-07-31 PROCEDURE — 76641 ULTRASOUND BREAST COMPLETE: CPT

## 2023-07-31 PROCEDURE — 77067 SCR MAMMO BI INCL CAD: CPT

## 2023-07-31 PROCEDURE — 76641 ULTRASOUND BREAST COMPLETE: CPT | Mod: 26,50

## 2023-07-31 PROCEDURE — 77063 BREAST TOMOSYNTHESIS BI: CPT

## 2023-10-12 NOTE — BRIEF OPERATIVE NOTE - PRE-OP DX
[facility] ILR alert received for multiple tachy episodes and 1 AF episode lasting 1 hr  occurring on or before  9/22/23     Following MD:  Dr. Taylor    Hx:  CAD s/p PCI, VT, SBO s/p colostomy, Orthostatic hypotension    Pt taking Toprol-XL 25 mg daily when Bp elevated.    Spoke with patient on 9/22/23 and c/o of dizziness when he is hypotensive     Tachy x 30, max duration 16 minutes and 53 secs, egm c/w prob SVT, 188 bpm  AF x 1, duration 1 hr 2 min, egm c/w NSR with PVCs                                  
Uterine polyp  06/12/2018    Active  Claudia Toledo

## 2024-08-05 ENCOUNTER — OUTPATIENT (OUTPATIENT)
Dept: OUTPATIENT SERVICES | Facility: HOSPITAL | Age: 54
LOS: 1 days | End: 2024-08-05
Payer: COMMERCIAL

## 2024-08-05 ENCOUNTER — APPOINTMENT (OUTPATIENT)
Dept: MAMMOGRAPHY | Facility: IMAGING CENTER | Age: 54
End: 2024-08-05

## 2024-08-05 ENCOUNTER — APPOINTMENT (OUTPATIENT)
Dept: ULTRASOUND IMAGING | Facility: IMAGING CENTER | Age: 54
End: 2024-08-05

## 2024-08-05 DIAGNOSIS — Z98.890 OTHER SPECIFIED POSTPROCEDURAL STATES: Chronic | ICD-10-CM

## 2024-08-05 DIAGNOSIS — R92.2 INCONCLUSIVE MAMMOGRAM: ICD-10-CM

## 2024-08-05 DIAGNOSIS — Z12.31 ENCOUNTER FOR SCREENING MAMMOGRAM FOR MALIGNANT NEOPLASM OF BREAST: ICD-10-CM

## 2024-08-05 PROCEDURE — 76641 ULTRASOUND BREAST COMPLETE: CPT

## 2024-08-05 PROCEDURE — 76641 ULTRASOUND BREAST COMPLETE: CPT | Mod: 26,50

## 2024-08-05 PROCEDURE — 77067 SCR MAMMO BI INCL CAD: CPT | Mod: 26

## 2024-08-05 PROCEDURE — 77067 SCR MAMMO BI INCL CAD: CPT

## 2024-08-05 PROCEDURE — 77063 BREAST TOMOSYNTHESIS BI: CPT | Mod: 26

## 2024-08-05 PROCEDURE — 77063 BREAST TOMOSYNTHESIS BI: CPT

## 2024-12-20 ENCOUNTER — TRANSCRIPTION ENCOUNTER (OUTPATIENT)
Age: 54
End: 2024-12-20

## 2025-08-09 ENCOUNTER — APPOINTMENT (OUTPATIENT)
Dept: MAMMOGRAPHY | Facility: CLINIC | Age: 55
End: 2025-08-09
Payer: COMMERCIAL

## 2025-08-09 ENCOUNTER — APPOINTMENT (OUTPATIENT)
Dept: ULTRASOUND IMAGING | Facility: CLINIC | Age: 55
End: 2025-08-09
Payer: COMMERCIAL

## 2025-08-09 PROCEDURE — 77063 BREAST TOMOSYNTHESIS BI: CPT

## 2025-08-09 PROCEDURE — 77067 SCR MAMMO BI INCL CAD: CPT

## 2025-08-09 PROCEDURE — 76641 ULTRASOUND BREAST COMPLETE: CPT | Mod: 50

## 2025-08-22 ENCOUNTER — EMERGENCY (EMERGENCY)
Facility: HOSPITAL | Age: 55
LOS: 1 days | End: 2025-08-22
Attending: STUDENT IN AN ORGANIZED HEALTH CARE EDUCATION/TRAINING PROGRAM
Payer: COMMERCIAL

## 2025-08-22 ENCOUNTER — RESULT REVIEW (OUTPATIENT)
Age: 55
End: 2025-08-22

## 2025-08-22 VITALS
RESPIRATION RATE: 17 BRPM | TEMPERATURE: 98 F | HEART RATE: 60 BPM | SYSTOLIC BLOOD PRESSURE: 111 MMHG | DIASTOLIC BLOOD PRESSURE: 71 MMHG | OXYGEN SATURATION: 96 %

## 2025-08-22 VITALS
HEIGHT: 65 IN | SYSTOLIC BLOOD PRESSURE: 154 MMHG | RESPIRATION RATE: 17 BRPM | OXYGEN SATURATION: 98 % | DIASTOLIC BLOOD PRESSURE: 93 MMHG | WEIGHT: 141.1 LBS | HEART RATE: 61 BPM | TEMPERATURE: 98 F

## 2025-08-22 DIAGNOSIS — Z98.890 OTHER SPECIFIED POSTPROCEDURAL STATES: Chronic | ICD-10-CM

## 2025-08-22 LAB
ADD ON TEST-SPECIMEN IN LAB: SIGNIFICANT CHANGE UP
ADD ON TEST-SPECIMEN IN LAB: SIGNIFICANT CHANGE UP
ALBUMIN SERPL ELPH-MCNC: 4.3 G/DL — SIGNIFICANT CHANGE UP (ref 3.3–5)
ALP SERPL-CCNC: 63 U/L — SIGNIFICANT CHANGE UP (ref 40–120)
ALT FLD-CCNC: 12 U/L — SIGNIFICANT CHANGE UP (ref 10–45)
ANION GAP SERPL CALC-SCNC: 11 MMOL/L — SIGNIFICANT CHANGE UP (ref 5–17)
APTT BLD: 29.8 SEC — SIGNIFICANT CHANGE UP (ref 26.1–36.8)
AST SERPL-CCNC: 12 U/L — SIGNIFICANT CHANGE UP (ref 10–40)
BASOPHILS # BLD AUTO: 0.03 K/UL — SIGNIFICANT CHANGE UP (ref 0–0.2)
BASOPHILS NFR BLD AUTO: 0.5 % — SIGNIFICANT CHANGE UP (ref 0–2)
BILIRUB SERPL-MCNC: 0.2 MG/DL — SIGNIFICANT CHANGE UP (ref 0.2–1.2)
BUN SERPL-MCNC: 13 MG/DL — SIGNIFICANT CHANGE UP (ref 7–23)
CALCIUM SERPL-MCNC: 10 MG/DL — SIGNIFICANT CHANGE UP (ref 8.4–10.5)
CHLORIDE SERPL-SCNC: 104 MMOL/L — SIGNIFICANT CHANGE UP (ref 96–108)
CO2 SERPL-SCNC: 26 MMOL/L — SIGNIFICANT CHANGE UP (ref 22–31)
CREAT SERPL-MCNC: 0.89 MG/DL — SIGNIFICANT CHANGE UP (ref 0.5–1.3)
D DIMER BLD IA.RAPID-MCNC: <150 NG/ML DDU — SIGNIFICANT CHANGE UP
EGFR: 77 ML/MIN/1.73M2 — SIGNIFICANT CHANGE UP
EGFR: 77 ML/MIN/1.73M2 — SIGNIFICANT CHANGE UP
EOSINOPHIL # BLD AUTO: 0.11 K/UL — SIGNIFICANT CHANGE UP (ref 0–0.5)
EOSINOPHIL NFR BLD AUTO: 1.7 % — SIGNIFICANT CHANGE UP (ref 0–6)
GLUCOSE SERPL-MCNC: 91 MG/DL — SIGNIFICANT CHANGE UP (ref 70–99)
HCT VFR BLD CALC: 39.5 % — SIGNIFICANT CHANGE UP (ref 34.5–45)
HGB BLD-MCNC: 13 G/DL — SIGNIFICANT CHANGE UP (ref 11.5–15.5)
IMM GRANULOCYTES # BLD AUTO: 0.02 K/UL — SIGNIFICANT CHANGE UP (ref 0–0.07)
IMM GRANULOCYTES NFR BLD AUTO: 0.3 % — SIGNIFICANT CHANGE UP (ref 0–0.9)
INR BLD: 0.89 RATIO — SIGNIFICANT CHANGE UP (ref 0.85–1.16)
LYMPHOCYTES # BLD AUTO: 1.69 K/UL — SIGNIFICANT CHANGE UP (ref 1–3.3)
LYMPHOCYTES NFR BLD AUTO: 26.2 % — SIGNIFICANT CHANGE UP (ref 13–44)
MCHC RBC-ENTMCNC: 31.4 PG — SIGNIFICANT CHANGE UP (ref 27–34)
MCHC RBC-ENTMCNC: 32.9 G/DL — SIGNIFICANT CHANGE UP (ref 32–36)
MCV RBC AUTO: 95.4 FL — SIGNIFICANT CHANGE UP (ref 80–100)
MONOCYTES # BLD AUTO: 0.46 K/UL — SIGNIFICANT CHANGE UP (ref 0–0.9)
MONOCYTES NFR BLD AUTO: 7.1 % — SIGNIFICANT CHANGE UP (ref 2–14)
NEUTROPHILS # BLD AUTO: 4.15 K/UL — SIGNIFICANT CHANGE UP (ref 1.8–7.4)
NEUTROPHILS NFR BLD AUTO: 64.2 % — SIGNIFICANT CHANGE UP (ref 43–77)
NRBC # BLD AUTO: 0 K/UL — SIGNIFICANT CHANGE UP (ref 0–0)
NRBC # FLD: 0 K/UL — SIGNIFICANT CHANGE UP (ref 0–0)
NRBC BLD AUTO-RTO: 0 /100 WBCS — SIGNIFICANT CHANGE UP (ref 0–0)
PLATELET # BLD AUTO: 316 K/UL — SIGNIFICANT CHANGE UP (ref 150–400)
PMV BLD: 10.8 FL — SIGNIFICANT CHANGE UP (ref 7–13)
POTASSIUM SERPL-MCNC: 3.5 MMOL/L — SIGNIFICANT CHANGE UP (ref 3.5–5.3)
POTASSIUM SERPL-SCNC: 3.5 MMOL/L — SIGNIFICANT CHANGE UP (ref 3.5–5.3)
PROT SERPL-MCNC: 7.1 G/DL — SIGNIFICANT CHANGE UP (ref 6–8.3)
PROTHROM AB SERPL-ACNC: 10.3 SEC — SIGNIFICANT CHANGE UP (ref 9.9–13.4)
RBC # BLD: 4.14 M/UL — SIGNIFICANT CHANGE UP (ref 3.8–5.2)
RBC # FLD: 12.3 % — SIGNIFICANT CHANGE UP (ref 10.3–14.5)
SODIUM SERPL-SCNC: 141 MMOL/L — SIGNIFICANT CHANGE UP (ref 135–145)
TROPONIN T, HIGH SENSITIVITY RESULT: <6 NG/L — SIGNIFICANT CHANGE UP
WBC # BLD: 6.46 K/UL — SIGNIFICANT CHANGE UP (ref 3.8–10.5)
WBC # FLD AUTO: 6.46 K/UL — SIGNIFICANT CHANGE UP (ref 3.8–10.5)

## 2025-08-22 PROCEDURE — 99285 EMERGENCY DEPT VISIT HI MDM: CPT | Mod: 25

## 2025-08-22 PROCEDURE — 76376 3D RENDER W/INTRP POSTPROCES: CPT

## 2025-08-22 PROCEDURE — 71046 X-RAY EXAM CHEST 2 VIEWS: CPT

## 2025-08-22 PROCEDURE — 85379 FIBRIN DEGRADATION QUANT: CPT

## 2025-08-22 PROCEDURE — 83880 ASSAY OF NATRIURETIC PEPTIDE: CPT

## 2025-08-22 PROCEDURE — 84484 ASSAY OF TROPONIN QUANT: CPT

## 2025-08-22 PROCEDURE — 85025 COMPLETE CBC W/AUTO DIFF WBC: CPT

## 2025-08-22 PROCEDURE — 80061 LIPID PANEL: CPT

## 2025-08-22 PROCEDURE — G0378: CPT

## 2025-08-22 PROCEDURE — 93356 MYOCRD STRAIN IMG SPCKL TRCK: CPT

## 2025-08-22 PROCEDURE — 85730 THROMBOPLASTIN TIME PARTIAL: CPT

## 2025-08-22 PROCEDURE — 93306 TTE W/DOPPLER COMPLETE: CPT

## 2025-08-22 PROCEDURE — 75574 CT ANGIO HRT W/3D IMAGE: CPT | Mod: 26

## 2025-08-22 PROCEDURE — 99285 EMERGENCY DEPT VISIT HI MDM: CPT

## 2025-08-22 PROCEDURE — 85610 PROTHROMBIN TIME: CPT

## 2025-08-22 PROCEDURE — 75574 CT ANGIO HRT W/3D IMAGE: CPT

## 2025-08-22 PROCEDURE — 83036 HEMOGLOBIN GLYCOSYLATED A1C: CPT

## 2025-08-22 PROCEDURE — 76376 3D RENDER W/INTRP POSTPROCES: CPT | Mod: 26

## 2025-08-22 PROCEDURE — 71046 X-RAY EXAM CHEST 2 VIEWS: CPT | Mod: 26

## 2025-08-22 PROCEDURE — 93306 TTE W/DOPPLER COMPLETE: CPT | Mod: 26

## 2025-08-22 PROCEDURE — 36000 PLACE NEEDLE IN VEIN: CPT | Mod: XU

## 2025-08-22 PROCEDURE — 80053 COMPREHEN METABOLIC PANEL: CPT

## 2025-08-22 PROCEDURE — 93308 TTE F-UP OR LMTD: CPT

## 2025-08-22 PROCEDURE — 93010 ELECTROCARDIOGRAM REPORT: CPT

## 2025-08-22 PROCEDURE — 93005 ELECTROCARDIOGRAM TRACING: CPT

## 2025-08-22 RX ORDER — ACETAMINOPHEN 500 MG/5ML
975 LIQUID (ML) ORAL ONCE
Refills: 0 | Status: COMPLETED | OUTPATIENT
Start: 2025-08-22 | End: 2025-08-22

## 2025-08-22 RX ADMIN — Medication 975 MILLIGRAM(S): at 17:26

## 2025-08-25 ENCOUNTER — TRANSCRIPTION ENCOUNTER (OUTPATIENT)
Age: 55
End: 2025-08-25

## 2025-08-26 ENCOUNTER — OUTPATIENT (OUTPATIENT)
Dept: OUTPATIENT SERVICES | Facility: HOSPITAL | Age: 55
LOS: 1 days | End: 2025-08-26
Payer: COMMERCIAL

## 2025-08-26 ENCOUNTER — APPOINTMENT (OUTPATIENT)
Dept: MRI IMAGING | Facility: IMAGING CENTER | Age: 55
End: 2025-08-26
Payer: COMMERCIAL

## 2025-08-26 DIAGNOSIS — Z98.890 OTHER SPECIFIED POSTPROCEDURAL STATES: Chronic | ICD-10-CM

## 2025-08-26 DIAGNOSIS — Z00.8 ENCOUNTER FOR OTHER GENERAL EXAMINATION: ICD-10-CM

## 2025-08-26 PROCEDURE — 74183 MRI ABD W/O CNTR FLWD CNTR: CPT

## 2025-08-26 PROCEDURE — A9585: CPT

## 2025-08-26 PROCEDURE — 74183 MRI ABD W/O CNTR FLWD CNTR: CPT | Mod: 26

## 2025-08-29 DIAGNOSIS — R07.89 OTHER CHEST PAIN: ICD-10-CM

## 2025-08-29 PROBLEM — R07.9 CHEST PAIN, UNSPECIFIED TYPE: Status: ACTIVE | Noted: 2025-08-29

## 2025-09-01 ENCOUNTER — NON-APPOINTMENT (OUTPATIENT)
Age: 55
End: 2025-09-01

## 2025-09-02 ENCOUNTER — APPOINTMENT (OUTPATIENT)
Dept: CARDIOLOGY | Facility: CLINIC | Age: 55
End: 2025-09-02
Payer: COMMERCIAL

## 2025-09-02 VITALS
OXYGEN SATURATION: 100 % | HEART RATE: 58 BPM | DIASTOLIC BLOOD PRESSURE: 84 MMHG | HEIGHT: 65 IN | SYSTOLIC BLOOD PRESSURE: 130 MMHG | WEIGHT: 141 LBS | BODY MASS INDEX: 23.49 KG/M2

## 2025-09-02 DIAGNOSIS — E78.2 MIXED HYPERLIPIDEMIA: ICD-10-CM

## 2025-09-02 DIAGNOSIS — R07.9 CHEST PAIN, UNSPECIFIED: ICD-10-CM

## 2025-09-02 DIAGNOSIS — R93.1 ABNORMAL FINDINGS ON DIAGNOSTIC IMAGING OF HEART AND CORONARY CIRCULATION: ICD-10-CM

## 2025-09-02 PROCEDURE — 99204 OFFICE O/P NEW MOD 45 MIN: CPT

## 2025-09-02 PROCEDURE — G2211 COMPLEX E/M VISIT ADD ON: CPT

## 2025-09-02 PROCEDURE — 93000 ELECTROCARDIOGRAM COMPLETE: CPT

## 2025-09-02 RX ORDER — ATORVASTATIN CALCIUM 20 MG/1
20 TABLET, FILM COATED ORAL
Qty: 90 | Refills: 3 | Status: ACTIVE | COMMUNITY